# Patient Record
Sex: MALE | Employment: OTHER | URBAN - METROPOLITAN AREA
[De-identification: names, ages, dates, MRNs, and addresses within clinical notes are randomized per-mention and may not be internally consistent; named-entity substitution may affect disease eponyms.]

---

## 2017-06-20 ENCOUNTER — OFFICE VISIT (OUTPATIENT)
Dept: INTERNAL MEDICINE | Facility: CLINIC | Age: 47
End: 2017-06-20

## 2017-06-20 VITALS — WEIGHT: 238.1 LBS | HEART RATE: 116 BPM | DIASTOLIC BLOOD PRESSURE: 89 MMHG | SYSTOLIC BLOOD PRESSURE: 138 MMHG

## 2017-06-20 DIAGNOSIS — E11.9 TYPE 2 DIABETES MELLITUS WITHOUT COMPLICATION, WITHOUT LONG-TERM CURRENT USE OF INSULIN (H): ICD-10-CM

## 2017-06-20 DIAGNOSIS — Z76.89 ENCOUNTER TO ESTABLISH CARE: ICD-10-CM

## 2017-06-20 DIAGNOSIS — E11.9 TYPE 2 DIABETES MELLITUS WITHOUT COMPLICATION, WITHOUT LONG-TERM CURRENT USE OF INSULIN (H): Primary | ICD-10-CM

## 2017-06-20 PROBLEM — E11.10 DIABETIC KETOACIDOSIS WITHOUT COMA (H): Status: ACTIVE | Noted: 2017-06-12

## 2017-06-20 LAB
ALBUMIN SERPL-MCNC: 3.5 G/DL (ref 3.4–5)
ALBUMIN UR-MCNC: NEGATIVE MG/DL
ALP SERPL-CCNC: 168 U/L (ref 40–150)
ALT SERPL W P-5'-P-CCNC: 98 U/L (ref 0–70)
ANION GAP SERPL CALCULATED.3IONS-SCNC: 11 MMOL/L (ref 3–14)
APPEARANCE UR: CLEAR
AST SERPL W P-5'-P-CCNC: 62 U/L (ref 0–45)
BILIRUB SERPL-MCNC: 0.3 MG/DL (ref 0.2–1.3)
BILIRUB UR QL STRIP: NEGATIVE
BUN SERPL-MCNC: 18 MG/DL (ref 7–30)
CALCIUM SERPL-MCNC: 9.2 MG/DL (ref 8.5–10.1)
CHLORIDE SERPL-SCNC: 93 MMOL/L (ref 94–109)
CO2 SERPL-SCNC: 26 MMOL/L (ref 20–32)
COLOR UR AUTO: ABNORMAL
CREAT SERPL-MCNC: 1 MG/DL (ref 0.66–1.25)
GFR SERPL CREATININE-BSD FRML MDRD: 80 ML/MIN/1.7M2
GLUCOSE SERPL-MCNC: 455 MG/DL (ref 70–99)
GLUCOSE UR STRIP-MCNC: >499 MG/DL
HBA1C MFR BLD: 14.1 % (ref 4.3–6)
HGB UR QL STRIP: NEGATIVE
KETONES UR STRIP-MCNC: 20 MG/DL
LEUKOCYTE ESTERASE UR QL STRIP: NEGATIVE
MUCOUS THREADS #/AREA URNS LPF: PRESENT /LPF
NITRATE UR QL: NEGATIVE
PH UR STRIP: 5 PH (ref 5–7)
POTASSIUM SERPL-SCNC: 4.5 MMOL/L (ref 3.4–5.3)
PROT SERPL-MCNC: 8.1 G/DL (ref 6.8–8.8)
RBC #/AREA URNS AUTO: 0 /HPF (ref 0–2)
SODIUM SERPL-SCNC: 130 MMOL/L (ref 133–144)
SP GR UR STRIP: 1.02 (ref 1–1.03)
URN SPEC COLLECT METH UR: ABNORMAL
UROBILINOGEN UR STRIP-MCNC: 0 MG/DL (ref 0–2)
WBC #/AREA URNS AUTO: 0 /HPF (ref 0–2)

## 2017-06-20 ASSESSMENT — PAIN SCALES - GENERAL: PAINLEVEL: NO PAIN (0)

## 2017-06-20 NOTE — PATIENT INSTRUCTIONS
[facility] Medication Refill Request Information:  * Please contact your pharmacy regarding ANY request for medication refills.  ** [facility] Prescription Fax = [phone]  * Please allow 3 business days for routine medication refills.  * Please allow 5 business days for controlled substance medication refills.     [facility] Test Result notification information:  *You will be notified with in 7-10 days of your appointment day regarding the results of your test.  If you are on MyChart you will be notified as soon as the provider has reviewed the results and signed off on them.    [facility] [phone]

## 2017-06-20 NOTE — NURSING NOTE
Chief Complaint   Patient presents with     Establish Care     patient states he is here to establish care and discuss possible dm     ASH WILKINS at 7:22 AM on 6/20/2017.
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
## 2017-06-20 NOTE — MR AVS SNAPSHOT
After Visit Summary   6/20/2017    Glenroy Peters    MRN: [id number]           Patient Information     Date Of Birth          1970        Visit Information        Provider Department      6/20/2017 7:25 AM Alec Wellington MD [facility]        Today's Diagnoses     Type 2 diabetes mellitus without complication, without long-term current use of insulin (H)    -  1      Care Instructions    Primary Care Center Medication Refill Request Information:  * Please contact your pharmacy regarding ANY request for medication refills.  ** [facility] Prescription Fax = [phone]  * Please allow 3 business days for routine medication refills.  * Please allow 5 business days for controlled substance medication refills.     Primary Care Center Test Result notification information:  *You will be notified with in 7-10 days of your appointment day regarding the results of your test.  If you are on MyChart you will be notified as soon as the provider has reviewed the results and signed off on them.    Primary Care Center [phone]             Follow-ups after your visit        Your next 10 appointments already scheduled     Jun 20, 2017  8:30 AM CDT   LAB with  LAB   [facility] ([facility])    [address] [place] [id number] [phone]           Patient must bring picture ID.  Patient should be prepared to give a urine specimen  Please do not eat 10-12 hours before your appointment if you are coming in fasting for labs on lipids, cholesterol, or glucose (sugar).  Pregnant women should follow their Care Team instructions. Water with medications is okay. Do not drink coffee or other fluids.   If you have concerns about taking  your medications, please ask at office or if scheduling via ThinkHR, send a message by clicking on Secure Messaging, Message Your Care Team.              Future tests that were ordered for you today     Open  Future Orders        Priority Expected Expires Ordered    Comprehensive metabolic panel Routine 2017    Hemoglobin A1c Routine 2017    UA with Micro reflex to Culture Routine 2017            Who to contact     Please call your clinic at [phone] to:    Ask questions about your health    Make or cancel appointments    Discuss your medicines    Learn about your test results    Speak to your doctor   If you have compliments or concerns about an experience at your clinic, or if you wish to file a complaint, please contact [facility] Patient Relations at [phone] or email us at [email or web address]         Additional Information About Your Visit        PolyInnovationsharStrands Information     Veritract is an electronic gateway that provides easy, online access to your medical records. With Veritract, you can request a clinic appointment, read your test results, renew a prescription or communicate with your care team.     To sign up for Veritract visit the website at [email or web address]   You will be asked to enter the access code listed below, as well as some personal information. Please follow the directions to create your username and password.     Your access code is: [email or web address]  Expires: 2017  6:30 AM     Your access code will  in 90 days. If you need help or a new code, please contact your [facility] or call [phone] for assistance.        Care EveryWhere ID     This is your Care EveryWhere ID. This could be used by other organizations to access your [facility] medical records  [id number]        Your Vitals Were     Pulse                   116            Blood Pressure from Last 3 Encounters:   17 138/89    Weight from Last 3 Encounters:   17 108 kg (238 lb 1.6 oz)               Primary Care Provider Office Phone # Fax #    Alec Wellington MD  [phone]       [facility] [address]  [facility] [address]        Thank you!     Thank you for choosing [facility]  for your care. Our goal is always to provide you with excellent care. Hearing back from our patients is one way we can continue to improve our services. Please take a few minutes to complete the written survey that you may receive in the mail after your visit with us. Thank you!             Your Updated Medication List - Protect others around you: Learn how to safely use, store and throw away your medicines at www.disposemymeds.org.      Notice  As of 6/20/2017  8:29 AM    You have not been prescribed any medications.

## 2017-06-21 ENCOUNTER — TELEPHONE (OUTPATIENT)
Dept: INTERNAL MEDICINE | Facility: CLINIC | Age: 47
End: 2017-06-21

## 2017-06-21 NOTE — TELEPHONE ENCOUNTER
I am trying to coordinate scheduling with diabetes education for sooner appt.  Left a message to the pt to call me back.

## 2017-07-07 ENCOUNTER — OFFICE VISIT (OUTPATIENT)
Dept: OPHTHALMOLOGY | Facility: CLINIC | Age: 47
End: 2017-07-07

## 2017-07-07 DIAGNOSIS — H52.4 PRESBYOPIA: Primary | ICD-10-CM

## 2017-07-07 DIAGNOSIS — E11.9 DIABETES MELLITUS TYPE 2 WITHOUT RETINOPATHY (H): ICD-10-CM

## 2017-07-07 ASSESSMENT — REFRACTION_MANIFEST
OS_ADD: +1.50
OD_CYLINDER: SPHERE
OS_SPHERE: +0.25
OD_ADD: +1.50
OD_SPHERE: +0.25
OS_CYLINDER: SPHERE

## 2017-07-07 ASSESSMENT — SLIT LAMP EXAM - LIDS
COMMENTS: NORMAL
COMMENTS: NORMAL

## 2017-07-07 ASSESSMENT — CONF VISUAL FIELD
METHOD: COUNTING FINGERS
OS_NORMAL: 1
OD_NORMAL: 1

## 2017-07-07 ASSESSMENT — VISUAL ACUITY
OS_SC: 20/20
OD_SC: 20/20
OS_SC+: -1
OD_SC+: -1
METHOD: SNELLEN - LINEAR

## 2017-07-07 ASSESSMENT — CUP TO DISC RATIO
OD_RATIO: 0.55
OS_RATIO: 0.5

## 2017-07-07 ASSESSMENT — TONOMETRY
OD_IOP_MMHG: 18
IOP_METHOD: ICARE
OS_IOP_MMHG: 14

## 2017-07-07 ASSESSMENT — EXTERNAL EXAM - LEFT EYE: OS_EXAM: NORMAL

## 2017-07-07 ASSESSMENT — EXTERNAL EXAM - RIGHT EYE: OD_EXAM: NORMAL

## 2017-07-07 NOTE — MR AVS SNAPSHOT
After Visit Summary   2017    Glenroy Peters    MRN: [id number]           Patient Information     Date Of Birth          1970        Visit Information        Provider Department      2017 9:00 AM Zion Marin OD [facility]        Today's Diagnoses     Presbyopia    -  1    Diabetes mellitus type 2 without retinopathy (H)           Follow-ups after your visit        Follow-up notes from your care team     Return in about 1 year (around 2018) for Annual Visit.      Who to contact     Please call your clinic at [phone] to:    Ask questions about your health    Make or cancel appointments    Discuss your medicines    Learn about your test results    Speak to your doctor   If you have compliments or concerns about an experience at your clinic, or if you wish to file a complaint, please contact [facility] Patient Relations at [phone] or email us at [email or web address]         Additional Information About Your Visit        MyChart Information     Pettat is an electronic gateway that provides easy, online access to your medical records. With PEPperPRINT, you can request a clinic appointment, read your test results, renew a prescription or communicate with your care team.     To sign up for Pettat visit the website at [email or web address]   You will be asked to enter the access code listed below, as well as some personal information. Please follow the directions to create your username and password.     Your access code is: [email or web address]  Expires: 2017  6:30 AM     Your access code will  in 90 days. If you need help or a new code, please contact your [facility] or call [phone] for assistance.        Care EveryWhere ID     This is your Care EveryWhere ID. This could be used by other organizations to access your [place] medical records  [id number]         Blood Pressure from  Last 3 Encounters:   06/20/17 138/89    Weight from Last 3 Encounters:   06/20/17 108 kg (238 lb 1.6 oz)              We Performed the Following     Refraction        Primary Care Provider Office Phone # Fax #    Alec Wellington [phone]       [address]  [facility] [address]        Equal Access to Services     GENNY FRASER : Hadii aad ku hadasho Soomaali, waaxda luqadaha, qaybta kaalmada adeegyada, waxay idiin hayaan adeeg kharash la'aan ah. So [facility] [phone].    ATENCIÓN: Si habla español, tiene a headley disposición servicios gratuitos de asistencia lingüística. Jia al [phone].    We comply with applicable federal civil rights laws and [place] laws. We do not discriminate on the basis of race, color, national origin, age, disability sex, sexual orientation or gender identity.            Thank you!     Thank you for choosing [facility] OPH[facility]  for your care. Our goal is always to provide you with excellent care. Hearing back from our patients is one way we can continue to improve our services. Please take a few minutes to complete the written survey that you may receive in the mail after your visit with us. Thank you!             Your Updated Medication List - Protect others around you: Learn how to safely use, store and throw away your medicines at www.disposemymeds.org.          This list is accurate as of: 7/7/17  9:10 AM.  Always use your most recent med list.                   Brand Name Dispense Instructions for use Diagnosis    blood glucose monitoring lancets     100 each    Use to test blood sugars one time daily or as directed.    Diabetes mellitus, type 2 (H)       blood glucose monitoring test strip    ONE TOUCH VERIO IQ    50 strip    Use to test blood sugars one times daily or as directed.    Diabetes mellitus, type 2 (H)       metFORMIN 500 MG 24 hr tablet    GLUCOPHAGE-XR    90 tablet    Take 1 tablet (500 mg) by mouth 2 times daily  (with meals)    Diabetes mellitus, type 2 (H)

## 2017-07-07 NOTE — PROGRESS NOTES
Assessment/Plan  (H52.4) Presbyopia  (primary encounter diagnosis)  Comment: Hyperopia with presbyopia OU  Plan: Refraction   Educated patient on condition and clinical findings. Dispensed spectacle prescription for full time wear or reading glasses only. Educated patient on possibility of adaptation period, if symptoms do not improve return to clinic for further testing.    (E11.9) Diabetes mellitus type 2 without retinopathy (H)  Comment: No retinopathy OU  Plan:  Educated patient on clinical findings and the importance of continued management with primary care physician. Continue management as directed and return to clinic in 1 year for dilated exam, or sooner, as needed.      Complete documentation of historical and exam elements from today's encounter can  be found in the full encounter summary report (not reduplicated in this progress  note). I personally obtained the chief complaint(s) and history of present illness. I  confirmed and edited as necessary the review of systems, past medical/surgical  history, family history, social history, and examination findings as documented by  others; and I examined the patient myself. I personally reviewed the relevant tests,  images, and reports as documented above. I formulated and edited as necessary the  assessment and plan and discussed the findings and management plan with the  patient and family.    Zion Marin, RUBA, FAAO

## 2017-07-07 NOTE — NURSING NOTE
"Chief Complaints and History of Present Illnesses   Patient presents with     New Eval For Glasses     Diabetic Eye Exam     HPI    Affected eye(s):  Both   Symptoms:     Blurred vision   Difficulty with reading (Comment: patient notes difficulty reading up close x 1 month)   No floaters   No flashes   No tearing   No Dryness         Do you have eye pain now?:  No      Comments:    Last BGL: \"about 5\" per pt today.   Lab Results       Component                Value               Date                       A1C                      14.1                06/20/2017           Ju Gonzalez July 7, 2017 8:19 AM                    "

## 2017-07-17 ENCOUNTER — OFFICE VISIT (OUTPATIENT)
Dept: EDUCATION SERVICES | Facility: CLINIC | Age: 47
End: 2017-07-17

## 2017-07-17 DIAGNOSIS — E11.9 DIABETES MELLITUS, TYPE 2 (H): Primary | ICD-10-CM

## 2017-07-17 NOTE — PATIENT INSTRUCTIONS
Diabetes Instructions    Start taking 2 tablets of Metformin once a day after you eat breakfast.  In one week, starting July 24 start taking 3 tablets after you eat breakfast  One week after that (July 31) start taking 4 tablets after you eat breakfast  If you find it difficult to take all 4 tablets at one time, you can split it up and take 2 in the morning and 2 in the evening.  Always take it after you have something on your stomach.

## 2017-07-17 NOTE — PROGRESS NOTES
"Diabetes Educator Note:    Glenroy stopped by to have me check his BG meter and to \"tell me how I'm doing\".    He states that he is trying to eat 3 meals per day but doesn't like doing this because he feels too full when he does this.    Had a discussion about reducing portion sizes and eating more regularly and reducing snacking.  He states he is working on not snacking so much.  He is drinking this Tropicana juice which contains carbohydrate.  He brought me a picture of the label, and I was able to show him the amount of sugar contained (12 grams per 4 ounces), and requested again that he stop drinking this and look for beverages that are labeled \"DIET\" or \"ZERO\" to eliminate some empty calories.  He states he has stopped drinking Jean Carlos-aid, and I gave him a lot of positive feedback for making this change.      He has still not increased the amount of metformin he is taking, so reviewed again how to safely increase the amount of Metformin he is taking to a total of 2000 mg daily.  He is going to start taking 1000 mg once a day (taking 500 mg now), and then increase over the next several weeks to a total of 2000 mg.  He verbalized understanding of these instructions.      He brought his meter with him and we downloaded it.  All of his fasting blood glucoses are between 200 and 300 or so:  See below.          Next appointment:  August 31 at 9:30am.      Time spent in this visit:  Less than 30 minutes.          "

## 2017-07-17 NOTE — MR AVS SNAPSHOT
After Visit Summary   2017    Glenroy Peters    MRN: [id number]           Patient Information     Date Of Birth          1970        Visit Information        Provider Department      2017 8:30 AM Jenni Adams RN [facility]        Today's Diagnoses     Diabetes mellitus, type 2 (H)    -  1      Care Instructions    Diabetes Instructions    Start taking 2 tablets of Metformin once a day after you eat breakfast.  In one week, starting  start taking 3 tablets after you eat breakfast  One week after that () start taking 4 tablets after you eat breakfast  If you find it difficult to take all 4 tablets at one time, you can split it up and take 2 in the morning and 2 in the evening.  Always take it after you have something on your stomach.              Follow-ups after your visit        Who to contact     Please call your clinic at [phone] to:    Ask questions about your health    Make or cancel appointments    Discuss your medicines    Learn about your test results    Speak to your doctor   If you have compliments or concerns about an experience at your clinic, or if you wish to file a complaint, please contact [facility] Patient Relations at [phone] or email us at [email or web address]         Additional Information About Your Visit        MyChart Information     [employer] Americah[employer] is an electronic gateway that provides easy, online access to your medical records. With BaubleBar, you can request a clinic appointment, read your test results, renew a prescription or communicate with your care team.     To sign up for SpearFysht visit the website at [email or web address]   You will be asked to enter the access code listed below, as well as some personal information. Please follow the directions to create your username and password.     Your access code is: [email or web address]  Expires: 2017  6:30 AM     Your access code will   in 90 days. If you need help or a new code, please contact your [facility] or call [phone] for assistance.        Care EveryWhere ID     This is your Care EveryWhere ID. This could be used by other organizations to access your [place] medical records  [id number]         Blood Pressure from Last 3 Encounters:   06/20/17 138/89    Weight from Last 3 Encounters:   06/20/17 108 kg (238 lb 1.6 oz)              Today, you had the following     No orders found for display       Primary Care Provider Office Phone # Fax #    Alec Wellington [phone]       [facility] [address]  [facility] [address]        Equal Access to Services     GENNY FRASER : Hadii polly garzao Soelle, waaxda luqadaha, qaybta kaalmada adeegyada, lazaro [place] . So [facility] [phone].    ATENCIÓN: Si habla español, tiene a headley disposición servicios gratuitos de asistencia lingüística. Llame al [phone].    We comply with applicable federal civil rights laws and [place] laws. We do not discriminate on the basis of race, color, national origin, age, disability sex, sexual orientation or gender identity.            Thank you!     Thank you for choosing [facility] DI[facility]ET[facility]  for your care. Our goal is always to provide you with excellent care. Hearing back from our patients is one way we can continue to improve our services. Please take a few minutes to complete the written survey that you may receive in the mail after your visit with us. Thank you!             Your Updated Medication List - Protect others around you: Learn how to safely use, store and throw away your medicines at www.disposemymeds.org.          This list is accurate as of: 7/17/17 10:45 AM.  Always use your most recent med list.                   Brand Name Dispense Instructions for use Diagnosis    blood glucose monitoring lancets     100 each    Use to test blood sugars one time  daily or as directed.    Diabetes mellitus, type 2 (H)       blood glucose monitoring test strip    ONE TOUCH VERIO IQ    50 strip    Use to test blood sugars one times daily or as directed.    Diabetes mellitus, type 2 (H)       metFORMIN 500 MG 24 hr tablet    GLUCOPHAGE-XR    90 tablet    Take 1 tablet (500 mg) by mouth 2 times daily (with meals)    Diabetes mellitus, type 2 (H)

## 2017-08-30 ENCOUNTER — OFFICE VISIT (OUTPATIENT)
Dept: EDUCATION SERVICES | Facility: CLINIC | Age: 47
End: 2017-08-30

## 2017-08-30 DIAGNOSIS — E11.9 DIABETES MELLITUS, TYPE 2 (H): Primary | ICD-10-CM

## 2017-08-30 NOTE — MR AVS SNAPSHOT
After Visit Summary   8/30/2017    Glenroy Peters    MRN: [id number]           Patient Information     Date Of Birth          1970        Visit Information        Provider Department      8/30/2017 12:30 PM Jenni Adams RN [facility] Diabetes        Care Instructions    Diabetes Instruction:      Great job!  Your fasting blood glucose is much better.    I want you to start checking sometimes 2 hours after you start eating a big meal.  Goal is <180.       Good job not drinking sugared beverages.  Keep up the good work.    Keep taking Metformin 2 tabs in the morning and 2 tabs with your lunch/dinner.    Your appointment with primary care is on September 26 at 10:00am.  They want you to arrive at 09:45am.               Follow-ups after your visit        Your next 10 appointments already scheduled     Sep 26, 2017 10:00 AM CDT   (Arrive by 9:45 AM)   Return Visit with Alec Wellington MD   [facility] ([facility])    [address] [place] [address] [phone]              Who to contact     Please call your clinic at [phone] to:    Ask questions about your health    Make or cancel appointments    Discuss your medicines    Learn about your test results    Speak to your doctor   If you have compliments or concerns about an experience at your clinic, or if you wish to file a complaint, please contact [facility] Patient Relations at [phone] or email us at [email or web address]         Additional Information About Your Visit        MyChart Information     Hakiat is an electronic gateway that provides easy, online access to your medical records. With OptionEase, you can request a clinic appointment, read your test results, renew a prescription or communicate with your care team.     To sign up for Hakiat visit the website at [email or web address]   You will be asked to enter  the access code listed below, as well as some personal information. Please follow the directions to create your username and password.     Your access code is: [email or web address]  Expires: 2017  6:30 AM     Your access code will  in 90 days. If you need help or a new code, please contact your [facility] or call [phone] for assistance.        Care EveryWhere ID     This is your Care EveryWhere ID. This could be used by other organizations to access your [facility] medical records  [id number]         Blood Pressure from Last 3 Encounters:   17 138/89    Weight from Last 3 Encounters:   17 108 kg (238 lb 1.6 oz)              Today, you had the following     No orders found for display       Primary Care Provider Office Phone # Fax #    Alec Wellington [phone]       [facility] [address] [facility] [address]  [facility] [address]        Equal Access to Services     GENNY FRASER : Hadii aad ku hadasho Soomaali, waaxda luqadaha, qaybta kaalmada adeegyada, waxay idiin hayfiorellan leonardo cleveland . So [facility] [phone].    ATENCIÓN: Si habla español, tiene a headley disposición servicios gratuitos de asistencia lingüística. Llame al [phone].    We comply with applicable federal civil rights laws and [place] laws. We do not discriminate on the basis of race, color, national origin, age, disability sex, sexual orientation or gender identity.            Thank you!     Thank you for choosing [facility] DIABETES  for your care. Our goal is always to provide you with excellent care. Hearing back from our patients is one way we can continue to improve our services. Please take a few minutes to complete the written survey that you may receive in the mail after your visit with us. Thank you!             Your Updated Medication List - Protect others around you: Learn how to safely use, store and throw away your medicines at www.disposemymeds.org.          This  list is accurate as of: 8/30/17  1:27 PM.  Always use your most recent med list.                   Brand Name Dispense Instructions for use Diagnosis    blood glucose monitoring lancets     100 each    Use to test blood sugars one time daily or as directed.    Diabetes mellitus, type 2 (H)       blood glucose monitoring test strip    ONE TOUCH VERIO IQ    50 strip    Use to test blood sugars one times daily or as directed.    Diabetes mellitus, type 2 (H)       metFORMIN 500 MG 24 hr tablet    GLUCOPHAGE-XR    90 tablet    Take 1 tablet (500 mg) by mouth 2 times daily (with meals)    Diabetes mellitus, type 2 (H)

## 2017-08-30 NOTE — PATIENT INSTRUCTIONS
Diabetes Instruction:      Great job!  Your fasting blood glucose is much better.    I want you to start checking sometimes 2 hours after you start eating a big meal.  Goal is <180.       Good job not drinking sugared beverages.  Keep up the good work.    Keep taking Metformin 2 tabs in the morning and 2 tabs with your lunch/dinner.    Your appointment with primary care is on September 26 at 10:00am.  They want you to arrive at 09:45am.

## 2017-09-01 VITALS — WEIGHT: 236.9 LBS

## 2017-09-01 RX ORDER — METFORMIN HCL 500 MG
1000 TABLET, EXTENDED RELEASE 24 HR ORAL 2 TIMES DAILY WITH MEALS
Qty: 120 TABLET | Refills: 11 | Status: SHIPPED | OUTPATIENT
Start: 2017-09-01 | End: 2017-09-26
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
## 2017-09-26 ENCOUNTER — OFFICE VISIT (OUTPATIENT)
Dept: INTERNAL MEDICINE | Facility: CLINIC | Age: 47
End: 2017-09-26

## 2017-09-26 VITALS
OXYGEN SATURATION: 96 % | DIASTOLIC BLOOD PRESSURE: 88 MMHG | SYSTOLIC BLOOD PRESSURE: 123 MMHG | WEIGHT: 241.8 LBS | HEART RATE: 85 BPM | RESPIRATION RATE: 20 BRPM

## 2017-09-26 DIAGNOSIS — Z13.220 SCREENING FOR HYPERLIPIDEMIA: ICD-10-CM

## 2017-09-26 DIAGNOSIS — Z13.29 SCREENING FOR THYROID DISORDER: ICD-10-CM

## 2017-09-26 DIAGNOSIS — E11.9 TYPE 2 DIABETES MELLITUS WITHOUT COMPLICATION, WITHOUT LONG-TERM CURRENT USE OF INSULIN (H): Primary | ICD-10-CM

## 2017-09-26 DIAGNOSIS — E11.9 TYPE 2 DIABETES MELLITUS WITHOUT COMPLICATION, WITHOUT LONG-TERM CURRENT USE OF INSULIN (H): ICD-10-CM

## 2017-09-26 LAB
CHOLEST SERPL-MCNC: 163 MG/DL
HBA1C MFR BLD: 7.7 % (ref 4.3–6)
HDLC SERPL-MCNC: 53 MG/DL
LDLC SERPL CALC-MCNC: 92 MG/DL
NONHDLC SERPL-MCNC: 110 MG/DL
T4 FREE SERPL-MCNC: 0.76 NG/DL (ref 0.76–1.46)
TRIGL SERPL-MCNC: 88 MG/DL
TSH SERPL DL<=0.005 MIU/L-ACNC: 5.07 MU/L (ref 0.4–4)

## 2017-09-26 RX ORDER — METFORMIN HCL 500 MG
1000 TABLET, EXTENDED RELEASE 24 HR ORAL 2 TIMES DAILY WITH MEALS
Qty: 120 TABLET | Refills: 3 | Status: SHIPPED | OUTPATIENT
Start: 2017-09-26 | End: 2024-08-12

## 2017-09-26 ASSESSMENT — PAIN SCALES - GENERAL: PAINLEVEL: NO PAIN (0)

## 2017-09-26 NOTE — PATIENT INSTRUCTIONS
[facility]: [phone]     [facility] Medication Refill Request Information:  * Please contact your pharmacy regarding ANY request for medication refills.  ** [facility] Prescription Fax = [phone]  * Please allow 3 business days for routine medication refills.  * Please allow 5 business days for controlled substance medication refills.     [facility] Test Result notification information:  *You will be notified with in 7-10 days of your appointment day regarding the results of your test.  If you are on MyChart you will be notified as soon as the provider has reviewed the results and signed off on them.

## 2017-09-26 NOTE — NURSING NOTE
Chief Complaint   Patient presents with     Diabetes     diabetic follow up   Gladis Houston LPN 10:29 AM on 9/26/2017

## 2017-09-26 NOTE — MR AVS SNAPSHOT
After Visit Summary   9/26/2017    Glenroy Peters    MRN: [id number]           Patient Information     Date Of Birth          1970        Visit Information        Provider Department      9/26/2017 10:00 AM Alec Wellington MD [facility]        Today's Diagnoses     Type 2 diabetes mellitus without complication, without long-term current use of insulin (H)    -  1    Screening for hyperlipidemia        Screening for thyroid disorder          Care Instructions    Primary Care Center: [phone]     Primary Care Center Medication Refill Request Information:  * Please contact your pharmacy regarding ANY request for medication refills.  ** [facility] Prescription Fax = [phone]  * Please allow 3 business days for routine medication refills.  * Please allow 5 business days for controlled substance medication refills.     Primary Care Center Test Result notification information:  *You will be notified with in 7-10 days of your appointment day regarding the results of your test.  If you are on MyChart you will be notified as soon as the provider has reviewed the results and signed off on them.          Follow-ups after your visit        Your next 10 appointments already scheduled     Sep 26, 2017 11:30 AM CDT   LAB with  LAB    Health Lab ([facility])    [address] [place] [id number] [phone]           Patient must bring picture ID. Patient should be prepared to give a urine specimen  Please do not eat 10-12 hours before your appointment if you are coming in fasting for labs on lipids, cholesterol, or glucose (sugar). Pregnant women should follow their Care Team instructions. Water with medications is okay. Do not drink coffee or other fluids. If you have concerns about taking  your medications, please ask at office or if scheduling via EGT, send a message by clicking on Secure Messaging, Message Your Care Team.               Future tests that were ordered for you today     Open Future Orders        Priority Expected Expires Ordered    Lipid panel reflex to direct LDL - -(Today) Routine 2017    TSH WITH FREE T4 REFLEX - (Today) Routine 2017    Hemoglobin A1c Routine 2017 10/10/2017 2017            Who to contact     Please call your clinic at [phone] to:    Ask questions about your health    Make or cancel appointments    Discuss your medicines    Learn about your test results    Speak to your doctor   If you have compliments or concerns about an experience at your clinic, or if you wish to file a complaint, please contact [facility] Patient Relations at [phone] or email us at [email or web address]         Additional Information About Your Visit        gShift LabsharMuch Better Adventures Information     [employer] is an electronic gateway that provides easy, online access to your medical records. With [employer], you can request a clinic appointment, read your test results, renew a prescription or communicate with your care team.     To sign up for [employer] visit the website at [email or web address]   You will be asked to enter the access code listed below, as well as some personal information. Please follow the directions to create your username and password.     Your access code is: [email or web address]  Expires: 2017 11:17 AM     Your access code will  in 90 days. If you need help or a new code, please contact your [facility] or call [phone] for assistance.        Care EveryWhere ID     This is your Care EveryWhere ID. This could be used by other organizations to access your [facility] medical records  [id number]        Your Vitals Were     Pulse Respirations Pulse Oximetry             85 20 96%          Blood Pressure from Last 3 Encounters:   17 123/88   17 138/89    Weight from Last 3  Encounters:   09/26/17 109.7 kg (241 lb 12.8 oz)   08/30/17 107.5 kg (236 lb 14.4 oz)   06/20/17 108 kg (238 lb 1.6 oz)                 Today's Medication Changes          These changes are accurate as of: 9/26/17 11:17 AM.  If you have any questions, ask your nurse or doctor.               These medicines have changed or have updated prescriptions.        Dose/Directions    metFORMIN 500 MG 24 hr tablet   Commonly known as:  GLUCOPHAGE-XR   This may have changed:    - how much to take  - when to take this   Used for:  Diabetes mellitus, type 2 (H)        Dose:  1000 mg   Take 2 tablets (1,000 mg) by mouth 2 times daily (with meals)   Quantity:  120 tablet   Refills:  11                Primary Care Provider Office Phone # Fax #    Alec Wellington [phone]       [address]  [facility] [address]        Equal Access to Services     GENNY FRASER : Dex Bonilla, waaxlorna farrell, qaybta kaalmada milagro, lazaro cain. So [facility] [phone].    ATENCIÓN: Si habla español, tiene a headley disposición servicios gratuitos de asistencia lingüística. Llame al [phone].    We comply with applicable federal civil rights laws and [place] laws. We do not discriminate on the basis of race, color, national origin, age, disability sex, sexual orientation or gender identity.            Thank you!     Thank you for choosing [facility]  for your care. Our goal is always to provide you with excellent care. Hearing back from our patients is one way we can continue to improve our services. Please take a few minutes to complete the written survey that you may receive in the mail after your visit with us. Thank you!             Your Updated Medication List - Protect others around you: Learn how to safely use, store and throw away your medicines at www.disposemymeds.org.          This list is accurate as of: 9/26/17 11:17 AM.   Always use your most recent med list.                   Brand Name Dispense Instructions for use Diagnosis    blood glucose monitoring lancets     100 each    Use to test blood sugars one time daily or as directed.    Diabetes mellitus, type 2 (H)       blood glucose monitoring test strip    ONE TOUCH VERIO IQ    50 strip    Use to test blood sugars one times daily or as directed.    Diabetes mellitus, type 2 (H)       metFORMIN 500 MG 24 hr tablet    GLUCOPHAGE-XR    120 tablet    Take 2 tablets (1,000 mg) by mouth 2 times daily (with meals)    Diabetes mellitus, type 2 (H)
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
## 2017-10-09 ENCOUNTER — TELEPHONE (OUTPATIENT)
Dept: RHEUMATOLOGY | Facility: CLINIC | Age: 47
End: 2017-10-09

## 2017-10-26 DIAGNOSIS — E11.9 TYPE 2 DIABETES MELLITUS WITHOUT COMPLICATION, WITHOUT LONG-TERM CURRENT USE OF INSULIN (H): ICD-10-CM

## 2017-10-26 DIAGNOSIS — E11.9 DIABETES MELLITUS, TYPE 2 (H): ICD-10-CM

## 2023-03-20 ENCOUNTER — OFFICE VISIT (OUTPATIENT)
Dept: INTERNAL MEDICINE | Facility: CLINIC | Age: 53
End: 2023-03-20
Payer: MEDICARE

## 2023-03-20 ENCOUNTER — LAB (OUTPATIENT)
Dept: LAB | Facility: CLINIC | Age: 53
End: 2023-03-20
Payer: MEDICARE

## 2023-03-20 ENCOUNTER — HOSPITAL ENCOUNTER (OUTPATIENT)
Facility: CLINIC | Age: 53
Discharge: HOME | End: 2023-03-20
Attending: INTERNAL MEDICINE | Admitting: INTERNAL MEDICINE
Payer: MEDICARE

## 2023-03-20 VITALS
WEIGHT: 238.6 LBS | TEMPERATURE: 98.4 F | SYSTOLIC BLOOD PRESSURE: 148 MMHG | OXYGEN SATURATION: 95 % | HEIGHT: 67 IN | HEART RATE: 117 BPM | BODY MASS INDEX: 37.45 KG/M2 | DIASTOLIC BLOOD PRESSURE: 99 MMHG

## 2023-03-20 DIAGNOSIS — R74.8 ELEVATED ALKALINE PHOSPHATASE LEVEL: ICD-10-CM

## 2023-03-20 DIAGNOSIS — N52.9 ERECTILE DYSFUNCTION, UNSPECIFIED ERECTILE DYSFUNCTION TYPE: ICD-10-CM

## 2023-03-20 DIAGNOSIS — E11.10 DIABETIC KETOACIDOSIS WITHOUT COMA ASSOCIATED WITH TYPE 2 DIABETES MELLITUS (H): ICD-10-CM

## 2023-03-20 DIAGNOSIS — E11.10 DIABETIC KETOACIDOSIS WITHOUT COMA ASSOCIATED WITH TYPE 2 DIABETES MELLITUS (H): Primary | ICD-10-CM

## 2023-03-20 DIAGNOSIS — E66.01 MORBID OBESITY (H): ICD-10-CM

## 2023-03-20 PROBLEM — F10.20 ALCOHOL DEPENDENCE (H): Status: ACTIVE | Noted: 2023-03-20

## 2023-03-20 LAB
ALBUMIN SERPL BCG-MCNC: 4.4 G/DL (ref 3.5–5.2)
ALP SERPL-CCNC: 141 U/L (ref 40–129)
ALT SERPL W P-5'-P-CCNC: 35 U/L (ref 10–50)
ANION GAP SERPL CALCULATED.3IONS-SCNC: 12 MMOL/L (ref 7–15)
AST SERPL W P-5'-P-CCNC: 28 U/L (ref 10–50)
BASOPHILS # BLD AUTO: 0 10E3/UL (ref 0–0.2)
BASOPHILS NFR BLD AUTO: 0 %
BILIRUB SERPL-MCNC: 0.2 MG/DL
BUN SERPL-MCNC: 18.9 MG/DL (ref 6–20)
CALCIUM SERPL-MCNC: 10.2 MG/DL (ref 8.6–10)
CHLORIDE SERPL-SCNC: 103 MMOL/L (ref 98–107)
CHOLEST SERPL-MCNC: 206 MG/DL
CREAT SERPL-MCNC: 1.11 MG/DL (ref 0.67–1.17)
DEPRECATED HCO3 PLAS-SCNC: 24 MMOL/L (ref 22–29)
EOSINOPHIL # BLD AUTO: 0 10E3/UL (ref 0–0.7)
EOSINOPHIL NFR BLD AUTO: 0 %
ERYTHROCYTE [DISTWIDTH] IN BLOOD BY AUTOMATED COUNT: 13.6 % (ref 10–15)
GFR SERPL CREATININE-BSD FRML MDRD: 79 ML/MIN/1.73M2
GLUCOSE SERPL-MCNC: 191 MG/DL (ref 70–99)
HBA1C MFR BLD: 8.5 %
HCT VFR BLD AUTO: 42.6 % (ref 40–53)
HDLC SERPL-MCNC: 57 MG/DL
HGB BLD-MCNC: 13.8 G/DL (ref 13.3–17.7)
IMM GRANULOCYTES # BLD: 0 10E3/UL
IMM GRANULOCYTES NFR BLD: 0 %
LDLC SERPL CALC-MCNC: 121 MG/DL
LYMPHOCYTES # BLD AUTO: 2.9 10E3/UL (ref 0.8–5.3)
LYMPHOCYTES NFR BLD AUTO: 39 %
MCH RBC QN AUTO: 26.4 PG (ref 26.5–33)
MCHC RBC AUTO-ENTMCNC: 32.4 G/DL (ref 31.5–36.5)
MCV RBC AUTO: 82 FL (ref 78–100)
MONOCYTES # BLD AUTO: 0.5 10E3/UL (ref 0–1.3)
MONOCYTES NFR BLD AUTO: 7 %
NEUTROPHILS # BLD AUTO: 3.9 10E3/UL (ref 1.6–8.3)
NEUTROPHILS NFR BLD AUTO: 54 %
NONHDLC SERPL-MCNC: 149 MG/DL
NRBC # BLD AUTO: 0 10E3/UL
NRBC BLD AUTO-RTO: 0 /100
PLATELET # BLD AUTO: 343 10E3/UL (ref 150–450)
POTASSIUM SERPL-SCNC: 4.1 MMOL/L (ref 3.4–5.3)
PROT SERPL-MCNC: 8.8 G/DL (ref 6.4–8.3)
RBC # BLD AUTO: 5.22 10E6/UL (ref 4.4–5.9)
SODIUM SERPL-SCNC: 139 MMOL/L (ref 136–145)
TRIGL SERPL-MCNC: 141 MG/DL
TSH SERPL DL<=0.005 MIU/L-ACNC: 5.06 UIU/ML (ref 0.3–4.2)
WBC # BLD AUTO: 7.4 10E3/UL (ref 4–11)

## 2023-03-20 PROCEDURE — 36415 COLL VENOUS BLD VENIPUNCTURE: CPT | Performed by: PATHOLOGY

## 2023-03-20 PROCEDURE — 83036 HEMOGLOBIN GLYCOSYLATED A1C: CPT

## 2023-03-20 PROCEDURE — 80061 LIPID PANEL: CPT | Performed by: PATHOLOGY

## 2023-03-20 PROCEDURE — 80050 GENERAL HEALTH PANEL: CPT | Performed by: PATHOLOGY

## 2023-03-20 PROCEDURE — 99204 OFFICE O/P NEW MOD 45 MIN: CPT | Performed by: INTERNAL MEDICINE

## 2023-03-20 PROCEDURE — 84403 ASSAY OF TOTAL TESTOSTERONE: CPT

## 2023-03-20 PROCEDURE — 84270 ASSAY OF SEX HORMONE GLOBUL: CPT

## 2023-03-20 PROCEDURE — 82043 UR ALBUMIN QUANTITATIVE: CPT

## 2023-03-20 PROCEDURE — 82977 ASSAY OF GGT: CPT | Performed by: PATHOLOGY

## 2023-03-20 PROCEDURE — 84439 ASSAY OF FREE THYROXINE: CPT | Performed by: PATHOLOGY

## 2023-03-20 PROCEDURE — 82570 ASSAY OF URINE CREATININE: CPT

## 2023-03-20 PROCEDURE — 99000 SPECIMEN HANDLING OFFICE-LAB: CPT | Performed by: PATHOLOGY

## 2023-03-20 RX ORDER — SILDENAFIL 100 MG/1
50 TABLET, FILM COATED ORAL DAILY PRN
Qty: 20 TABLET | Refills: 1 | Status: SHIPPED | OUTPATIENT
Start: 2023-03-20 | End: 2024-08-09

## 2023-03-20 NOTE — NURSING NOTE
"Glenroy Peters is a 52 year old male patient that presents today in clinic for the following:    Chief Complaint   Patient presents with     RECHECK     Discuss erectile dysfunction.      The patient's allergies and medications were reviewed as noted. A set of vitals were recorded as noted without incident: BP (!) 148/99 (BP Location: Right arm, Patient Position: Sitting, Cuff Size: Adult Large)   Pulse 117   Temp 98.4  F (36.9  C) (Oral)   Ht 1.689 m (5' 6.5\")   Wt 108.2 kg (238 lb 9.6 oz)   SpO2 95%   BMI 37.93 kg/m  . The patient does not have any other questions for the provider.    Cristel Ventura, EMT at 6:16 PM on 3/20/2023.  Primary care clinic: [phone]  "
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
## 2023-03-21 LAB
CREAT UR-MCNC: 195 MG/DL
MICROALBUMIN UR-MCNC: 209 MG/L
MICROALBUMIN/CREAT UR: 107.18 MG/G CR (ref 0–17)
SHBG SERPL-SCNC: 26 NMOL/L (ref 11–80)
T4 FREE SERPL-MCNC: 1.05 NG/DL (ref 0.9–1.7)

## 2023-03-22 LAB
TESTOST FREE SERPL-MCNC: 3.32 NG/DL
TESTOST SERPL-MCNC: 155 NG/DL (ref 240–950)

## 2023-03-23 LAB — GGT SERPL-CCNC: 32 U/L (ref 8–61)

## 2024-04-04 ENCOUNTER — TELEPHONE (OUTPATIENT)
Dept: INTERNAL MEDICINE | Facility: CLINIC | Age: 54
End: 2024-04-04
Payer: MEDICARE

## 2024-04-04 NOTE — TELEPHONE ENCOUNTER
[facility] Call Center    Phone Message    May a detailed message be left on voicemail: Yes    Reason for Call: Medication Question or concern regarding medication   Prescription Clarification  Name of Medication: sildenafil (VIAGRA) 100 MG tablet   Prescribing Provider: Shane Izaguirre MD    Pharmacy: [facility] in [place]   What on the order needs clarification? Patient calling requesting a different medication due to it not working for patient. Please review patient is requesting a different pill that will work and it is the blue one. Wanting to know how much they will cost. Thank you!      Action Taken: Message routed to:  [facility] ([facility]    Travel Screening: Not Applicable

## 2024-08-09 ENCOUNTER — OFFICE VISIT (OUTPATIENT)
Dept: INTERNAL MEDICINE | Facility: CLINIC | Age: 54
End: 2024-08-09
Payer: MEDICARE

## 2024-08-09 ENCOUNTER — LAB (OUTPATIENT)
Dept: LAB | Facility: CLINIC | Age: 54
End: 2024-08-09
Payer: MEDICARE

## 2024-08-09 VITALS
DIASTOLIC BLOOD PRESSURE: 98 MMHG | SYSTOLIC BLOOD PRESSURE: 145 MMHG | OXYGEN SATURATION: 98 % | BODY MASS INDEX: 37.6 KG/M2 | HEART RATE: 101 BPM | WEIGHT: 236.5 LBS

## 2024-08-09 DIAGNOSIS — R74.8 ELEVATED ALKALINE PHOSPHATASE LEVEL: Primary | ICD-10-CM

## 2024-08-09 DIAGNOSIS — N52.9 ERECTILE DYSFUNCTION, UNSPECIFIED ERECTILE DYSFUNCTION TYPE: ICD-10-CM

## 2024-08-09 DIAGNOSIS — E11.10 DIABETIC KETOACIDOSIS WITHOUT COMA ASSOCIATED WITH TYPE 2 DIABETES MELLITUS (H): ICD-10-CM

## 2024-08-09 DIAGNOSIS — E66.01 MORBID OBESITY (H): ICD-10-CM

## 2024-08-09 DIAGNOSIS — Z00.00 ROUTINE HEALTH MAINTENANCE: ICD-10-CM

## 2024-08-09 DIAGNOSIS — Z72.89 OTHER PROBLEMS RELATED TO LIFESTYLE: ICD-10-CM

## 2024-08-09 DIAGNOSIS — R74.8 ELEVATED ALKALINE PHOSPHATASE LEVEL: ICD-10-CM

## 2024-08-09 DIAGNOSIS — E11.9 TYPE 2 DIABETES MELLITUS WITHOUT COMPLICATION, WITHOUT LONG-TERM CURRENT USE OF INSULIN (H): ICD-10-CM

## 2024-08-09 DIAGNOSIS — Z11.59 NEED FOR HEPATITIS C SCREENING TEST: ICD-10-CM

## 2024-08-09 LAB
ALBUMIN SERPL BCG-MCNC: 4.1 G/DL (ref 3.5–5.2)
ALP SERPL-CCNC: 142 U/L (ref 40–150)
ALT SERPL W P-5'-P-CCNC: 27 U/L (ref 0–70)
ANION GAP SERPL CALCULATED.3IONS-SCNC: 10 MMOL/L (ref 7–15)
AST SERPL W P-5'-P-CCNC: 29 U/L (ref 0–45)
BASOPHILS # BLD AUTO: 0 10E3/UL (ref 0–0.2)
BASOPHILS NFR BLD AUTO: 1 %
BILIRUB SERPL-MCNC: 0.2 MG/DL
BUN SERPL-MCNC: 15.1 MG/DL (ref 6–20)
CALCIUM SERPL-MCNC: 9.6 MG/DL (ref 8.8–10.4)
CHLORIDE SERPL-SCNC: 101 MMOL/L (ref 98–107)
CHOLEST SERPL-MCNC: 198 MG/DL
CREAT SERPL-MCNC: 1.13 MG/DL (ref 0.67–1.17)
EGFRCR SERPLBLD CKD-EPI 2021: 76 ML/MIN/1.73M2
EOSINOPHIL # BLD AUTO: 0.1 10E3/UL (ref 0–0.7)
EOSINOPHIL NFR BLD AUTO: 1 %
ERYTHROCYTE [DISTWIDTH] IN BLOOD BY AUTOMATED COUNT: 13.3 % (ref 10–15)
FASTING STATUS PATIENT QL REPORTED: NO
FASTING STATUS PATIENT QL REPORTED: NO
GGT SERPL-CCNC: 26 U/L (ref 8–61)
GLUCOSE SERPL-MCNC: 111 MG/DL (ref 70–99)
HBA1C MFR BLD: 8.2 %
HCO3 SERPL-SCNC: 26 MMOL/L (ref 22–29)
HCT VFR BLD AUTO: 42 % (ref 40–53)
HCV AB SERPL QL IA: NONREACTIVE
HDLC SERPL-MCNC: 45 MG/DL
HGB BLD-MCNC: 13.7 G/DL (ref 13.3–17.7)
IMM GRANULOCYTES # BLD: 0 10E3/UL
IMM GRANULOCYTES NFR BLD: 0 %
LDLC SERPL CALC-MCNC: 103 MG/DL
LYMPHOCYTES # BLD AUTO: 2.2 10E3/UL (ref 0.8–5.3)
LYMPHOCYTES NFR BLD AUTO: 38 %
MCH RBC QN AUTO: 26.4 PG (ref 26.5–33)
MCHC RBC AUTO-ENTMCNC: 32.6 G/DL (ref 31.5–36.5)
MCV RBC AUTO: 81 FL (ref 78–100)
MONOCYTES # BLD AUTO: 0.5 10E3/UL (ref 0–1.3)
MONOCYTES NFR BLD AUTO: 8 %
NEUTROPHILS # BLD AUTO: 3.1 10E3/UL (ref 1.6–8.3)
NEUTROPHILS NFR BLD AUTO: 52 %
NONHDLC SERPL-MCNC: 153 MG/DL
NRBC # BLD AUTO: 0 10E3/UL
NRBC BLD AUTO-RTO: 0 /100
PLATELET # BLD AUTO: 366 10E3/UL (ref 150–450)
POTASSIUM SERPL-SCNC: 4.2 MMOL/L (ref 3.4–5.3)
PROT SERPL-MCNC: 8.6 G/DL (ref 6.4–8.3)
RBC # BLD AUTO: 5.18 10E6/UL (ref 4.4–5.9)
SHBG SERPL-SCNC: 25 NMOL/L (ref 11–80)
SODIUM SERPL-SCNC: 137 MMOL/L (ref 135–145)
T4 FREE SERPL-MCNC: 0.9 NG/DL (ref 0.9–1.7)
TRIGL SERPL-MCNC: 250 MG/DL
TSH SERPL DL<=0.005 MIU/L-ACNC: 5.91 UIU/ML (ref 0.3–4.2)
WBC # BLD AUTO: 5.9 10E3/UL (ref 4–11)

## 2024-08-09 PROCEDURE — 99214 OFFICE O/P EST MOD 30 MIN: CPT | Performed by: INTERNAL MEDICINE

## 2024-08-09 PROCEDURE — 84270 ASSAY OF SEX HORMONE GLOBUL: CPT | Performed by: INTERNAL MEDICINE

## 2024-08-09 PROCEDURE — 86803 HEPATITIS C AB TEST: CPT | Performed by: INTERNAL MEDICINE

## 2024-08-09 PROCEDURE — 84443 ASSAY THYROID STIM HORMONE: CPT | Performed by: PATHOLOGY

## 2024-08-09 PROCEDURE — 36415 COLL VENOUS BLD VENIPUNCTURE: CPT | Performed by: PATHOLOGY

## 2024-08-09 PROCEDURE — 99000 SPECIMEN HANDLING OFFICE-LAB: CPT | Performed by: PATHOLOGY

## 2024-08-09 PROCEDURE — 84439 ASSAY OF FREE THYROXINE: CPT | Performed by: PATHOLOGY

## 2024-08-09 PROCEDURE — 80053 COMPREHEN METABOLIC PANEL: CPT | Performed by: PATHOLOGY

## 2024-08-09 PROCEDURE — 83036 HEMOGLOBIN GLYCOSYLATED A1C: CPT | Performed by: INTERNAL MEDICINE

## 2024-08-09 PROCEDURE — 80061 LIPID PANEL: CPT | Performed by: PATHOLOGY

## 2024-08-09 PROCEDURE — 85025 COMPLETE CBC W/AUTO DIFF WBC: CPT | Performed by: PATHOLOGY

## 2024-08-09 PROCEDURE — 82977 ASSAY OF GGT: CPT | Performed by: PATHOLOGY

## 2024-08-09 PROCEDURE — 84403 ASSAY OF TOTAL TESTOSTERONE: CPT | Performed by: INTERNAL MEDICINE

## 2024-08-09 RX ORDER — SILDENAFIL 100 MG/1
50 TABLET, FILM COATED ORAL DAILY PRN
Qty: 30 TABLET | Refills: 3 | Status: SHIPPED | OUTPATIENT
Start: 2024-08-09
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
## 2024-08-09 NOTE — PROGRESS NOTES
Glenroy is a 54 year old that presents in clinic today for the following:     Chief Complaint   Patient presents with    Follow Up           8/9/2024     2:04 PM   Additional Questions   Roomed by MR     Screenings as of today     PHQ-2 Total Score (Adult) - Positive if 3 or more points; Administer   PHQ-9 if positive 0        ELIZABETH Ledezma at 2:09 PM on 8/9/2024

## 2024-08-12 RX ORDER — METFORMIN HCL 500 MG
500 TABLET, EXTENDED RELEASE 24 HR ORAL 2 TIMES DAILY WITH MEALS
Qty: 180 TABLET | Refills: 3 | Status: SHIPPED | OUTPATIENT
Start: 2024-08-12

## 2024-08-13 LAB
TESTOST FREE SERPL-MCNC: 5.01 NG/DL
TESTOST SERPL-MCNC: 224 NG/DL (ref 240–950)